# Patient Record
Sex: MALE | Race: WHITE | HISPANIC OR LATINO
[De-identification: names, ages, dates, MRNs, and addresses within clinical notes are randomized per-mention and may not be internally consistent; named-entity substitution may affect disease eponyms.]

---

## 2019-01-22 PROBLEM — Z00.00 ENCOUNTER FOR PREVENTIVE HEALTH EXAMINATION: Status: ACTIVE | Noted: 2019-01-22

## 2019-02-11 ENCOUNTER — RECORD ABSTRACTING (OUTPATIENT)
Age: 59
End: 2019-02-11

## 2019-02-11 DIAGNOSIS — E78.00 PURE HYPERCHOLESTEROLEMIA, UNSPECIFIED: ICD-10-CM

## 2019-02-11 DIAGNOSIS — Z80.41 FAMILY HISTORY OF MALIGNANT NEOPLASM OF OVARY: ICD-10-CM

## 2019-02-11 DIAGNOSIS — I10 ESSENTIAL (PRIMARY) HYPERTENSION: ICD-10-CM

## 2019-02-11 RX ORDER — RAMIPRIL 10 MG/1
10 CAPSULE ORAL DAILY
Refills: 0 | Status: ACTIVE | COMMUNITY

## 2019-02-11 RX ORDER — ROSUVASTATIN CALCIUM 5 MG/1
5 TABLET, FILM COATED ORAL DAILY
Refills: 0 | Status: ACTIVE | COMMUNITY

## 2019-02-21 ENCOUNTER — RESULT REVIEW (OUTPATIENT)
Age: 59
End: 2019-02-21

## 2019-02-21 ENCOUNTER — APPOINTMENT (OUTPATIENT)
Dept: UROLOGY | Facility: CLINIC | Age: 59
End: 2019-02-21
Payer: COMMERCIAL

## 2019-02-21 VITALS
BODY MASS INDEX: 29.73 KG/M2 | HEIGHT: 66 IN | SYSTOLIC BLOOD PRESSURE: 136 MMHG | DIASTOLIC BLOOD PRESSURE: 82 MMHG | WEIGHT: 185 LBS | HEART RATE: 74 BPM

## 2019-02-21 DIAGNOSIS — R63.1 POLYDIPSIA: ICD-10-CM

## 2019-02-21 DIAGNOSIS — Z87.438 PERSONAL HISTORY OF OTHER DISEASES OF MALE GENITAL ORGANS: ICD-10-CM

## 2019-02-21 PROCEDURE — 99214 OFFICE O/P EST MOD 30 MIN: CPT | Mod: 25

## 2019-02-21 PROCEDURE — 76857 US EXAM PELVIC LIMITED: CPT

## 2019-02-21 NOTE — CHIEF COMPLAINT
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

## 2019-02-21 NOTE — ASSESSMENT
[FreeTextEntry1] : Андрей West is a 58-year-old gentleman with long-standing history of polydipsia involutional urinary retention resulting in chronic bladder distention injury and chronic incomplete bladder emptying. It remains unclear as to what the patient's true postvoid residuals are outside the office as he is generally unable to urinate when asked. His residual today was one of the largest encountered. The absence of urinary urgency is clearly of concern and the danger of progressive bladder injury (including eventual permanent bladder dysfunction) and the risk of severe obstructive nephropathy, and infection were discussed with the patient today. Ultrasound findings suggest progressive prostatic growth which in turns suggest at outlet obstruction may becoming more of a factor. We last performed cystoscopy failed to demonstrate an urgent need for TURP or TULAP , and given this patient's volitional retention was further recognized that postoperative incomplete bladder entering remained a possibility.\par \par Intermittent self-catheterization was discussed today as a means of assuring bladder and they at least 3 times a day. The possible need for regular intermittent catheterization future was also discussed. For now the decision was made to proceed with cystoscopy to determine whether or not the time has come to reconsider surgical intervention.

## 2019-02-21 NOTE — HISTORY OF PRESENT ILLNESS
[FreeTextEntry1] : Last seen 05/14/18)\par        .\par        Андрей West is a 58-year-old gentleman with long-standing polydipsia associated with volitional urinary retention resulting in recurrent prostatitis and progressive bladder overdistention injury. In January of 2017 cystoscopy was performed to determine whether or not TULAP was indicated. Prior to examination Mr. Tejeda postvoid residual was 300 cc. The prostate gland appeared to be small without evidence of significant outlet obstruction. The bladder was found to be smooth. With rapid filling the patient was also able to empty his bladder completely with average force but with a quite prolonged urinary stream. \par        Mr. West was asked to cut back his fluid intake by half and to keep an intake and output log so he would have a better sense of his consumption and voiding pattern. Bladder flaccidity, was suspected to be the consequence of volitional overdistention, a finding that did prompt neurologic evaluation to rule out any obvious spinal lesions. Neurologic workup proved to be benign\par       When last seen in May of 2018 it appeared as though Mr. Tejeda had significantly reduced his fluid consumption. He reported he had been voiding every 2 hours, by the clock, as advised, and was voiding in  volumes ranging between 50 and 275 cc (average 150 cc).  TULAP was again discussed but it remained unclear if the surgery would help as the problem appears to be volitional.\par \par          According to the patient's most recent Intake and Output log he is voiding 6-8x/d in volumes 250 ml (in the AM) to 50 ml averaging 100-125 ml.  Intake ranges from 1100 to 700 ml/day (average 900-1000 ml).  As is often the case, the patient was unable to void in the office today.\par        .\par        PERTINENT UROLOGIC HISTORY:\par        .\par        11/16/15: PSA of 5.51; free PSA 12%\par        02/16/16: PSA 3.84; free PSA 15%\par        05/04/17: PSA 4.94; free PSA 14% (Dr. Frazier)\par        10/31/17: PSA 3.74; free PSA 20%\par        10/31/17: PELVIC ULTRASONOGRAPHY; bladder found to contain 298.77 cc. This was not postvoid residual. Postvoid residual calculated by extrapolation felt to be approximately 150 cc; prostate 66.16 cc.; PPSA 8.00\par        04/27/18: PSA 4.13; BUN 22; creatinine 1.06 (Dr. Frazier)\par        05/10/18: PSA 4.2; free PSA 19%\par \par 05/14/18: PSA 4.50; free PSA 25%; 4K Score 2%\par \par 02/21/19: Pelvic ultrasonography; 366.75 cc found within the bladder. This was no a postvoid residual as the patient had voided 2 hours earlier and had been unable to void ; Prostatic volume was estimated to be 87.34 cc. (PPSA 10.48); renal ultrasonography demonstrated normal kidneys bilaterally. There was no hydronephrosis.\par \par \par CURRENT UROLOGIC REVIEW OF SYSTEMS:\par \par Incontinence Assessed?.  YES\par \par Nocturia:  (-) 0-1 x/night\par Frequency: (-)  5-8   x/day    (TRYING TO VOID BY THE CLOCK AS ADVISED; REPORTS INTAKE "CLOSE TO A LITER A DAY, MAYBE MORE")\par Dysuria: (-)\par Urgency:  (-) \par Hesitancy:  (-)\par Intermittency:  (-)\par Sensation of Incomplete Voiding :  (-)\par Double voiding :  (-)\par Stress incontinence:  (-)\par Urge incontinence:  (-)\par Use of absorbent pads:  (-)\par Terminal dribbling:  (-)\par Status of stream: "WEAK"\par Venereal disease:  (-)\par Kidney stones:  (-)\par UTIs:  (+)  RECURRING PROSTATITIS\par Prior urologic surgery:  (+)   CYSTOSCOPY ONLY\par Hematuria:  (-)\par Abdominal pressure:  (-)\par Back pain:  (-)\par Sexual Status: ERECTIONS ARE GOOD; NO PAIN OR BLOOD WITH EJACULATION\par Gout:  (-)\par Renal problems:  (-)\par Family History of Urologic Problems:  (-)\par International Prostate Symptom Score (IPSS):  12 (Moderate 8-19)\par  Satisfaction Index: 2 (mostly satisfied)

## 2019-02-21 NOTE — REVIEW OF SYSTEMS
[Feeling Tired] : feeling tired [see HPI] : see HPI [Negative] : Heme/Lymph [Fever] : no fever [Chills] : no chills [Feeling Poorly] : not feeling poorly [Recent Weight Loss (___ Lbs)] : no recent weight loss [Visual Disturbances] : no visual disturbances [Loss Of Hearing] : no hearing loss [Nosebleeds] : no nosebleeds [Chest Pain] : no chest pain [Palpitations] : no palpitations [Leg Claudication] : no intermittent leg claudication [Lower Ext Edema] : no extremity edema [Shortness Of Breath] : no shortness of breath [Cough] : no cough [Orthopnea] : no orthopnea [Wheezing] : no wheezing [SOB on Exertion] : no shortness of breath during exertion [PND] : no PND [Abdominal Pain] : no abdominal pain [Constipation] : no constipation [Diarrhea] : no diarrhea [Heartburn] : no heartburn [Melena] : no melena

## 2019-02-21 NOTE — PHYSICAL EXAM
[General Appearance - Well Developed] : well developed [General Appearance - Well Nourished] : well nourished [Normal Appearance] : normal appearance [Well Groomed] : well groomed [General Appearance - In No Acute Distress] : no acute distress [Abdomen Soft] : soft [Abdomen Tenderness] : non-tender [Abdomen Mass (___ Cm)] : no abdominal mass palpated [Abdomen Hernia] : no hernia was discovered [Costovertebral Angle Tenderness] : no ~M costovertebral angle tenderness [Size (3+)] : size was 3+ [Rectal Exam - Seminal Vesicles] : was normal [Nl Sphincter Tone] : normal sphincter tone [Nl Perineum] : perineum was normal on inspection [No Lesions] : no lesions [Normal] : normal [Testes] : normal [Epididymis] : was normal [Skin Color & Pigmentation] : normal skin color and pigmentation [5th Left ICS - MCL] : palpated at the 5th LICS in the midclavicular line [Normal Rate] : normal [Normal S1] : normal S1 [Normal S2] : normal S2 [No Murmur] : no murmurs heard [No Pitting Edema] : no pitting edema present [] : no respiratory distress [Respiration, Rhythm And Depth] : normal respiratory rhythm and effort [Exaggerated Use Of Accessory Muscles For Inspiration] : no accessory muscle use [Auscultation Breath Sounds / Voice Sounds] : lungs were clear to auscultation bilaterally [Chest Palpation] : palpation of the chest revealed no abnormalities [Lungs Percussion] : the lungs were normal to percussion [Oriented To Time, Place, And Person] : oriented to person, place, and time [Affect] : the affect was normal [Mood] : the mood was normal [Not Anxious] : not anxious [Normal Station and Gait] : the gait and station were normal for the patient's age [No Focal Deficits] : no focal deficits [No Palpable Adenopathy] : no palpable adenopathy [Prostate Hard Area Or Nodule Bilaterally] : had no palpable nodules [Rectal Exam - Prostate] : was not indurated [Prostate Tenderness] : was not tender [Prostate Fluctuant] : was not fluctuant [Occult Blood Positive] : exam was negative for occult blood [Rectal Tenderness] : no tenderness [Mass___cm] : no rectal masses [Adherent Prepuce] : no adherence of the prepuce [Circumcised] : the penis was uncircumcised [S3] : no S3 [S4] : no S4 [FreeTextEntry1] : NORMAL DTR; NORMAL PERINEAL SENSATION, NORMAL CRANIAL NERVES

## 2019-02-22 ENCOUNTER — TRANSCRIPTION ENCOUNTER (OUTPATIENT)
Age: 59
End: 2019-02-22

## 2019-04-29 ENCOUNTER — APPOINTMENT (OUTPATIENT)
Dept: UROLOGY | Facility: CLINIC | Age: 59
End: 2019-04-29
Payer: COMMERCIAL

## 2019-04-29 ENCOUNTER — RESULT REVIEW (OUTPATIENT)
Age: 59
End: 2019-04-29

## 2019-04-29 VITALS
WEIGHT: 185 LBS | DIASTOLIC BLOOD PRESSURE: 68 MMHG | SYSTOLIC BLOOD PRESSURE: 119 MMHG | HEIGHT: 66 IN | HEART RATE: 72 BPM | BODY MASS INDEX: 29.73 KG/M2

## 2019-04-29 DIAGNOSIS — N40.1 BENIGN PROSTATIC HYPERPLASIA WITH LOWER URINARY TRACT SYMPMS: ICD-10-CM

## 2019-04-29 LAB
BACTERIA: 0
BILIRUB UR QL STRIP: NORMAL
CASTS: 0
CLARITY UR: CLEAR
COLLECTION METHOD: NORMAL
CRYSTALS: 0
EPITHELIAL CELLS: 0
GLUCOSE UR-MCNC: NORMAL
HCG UR QL: NORMAL EU/DL
HGB UR QL STRIP.AUTO: NORMAL
KETONES UR-MCNC: NORMAL
LEUKOCYTE ESTERASE UR QL STRIP: NORMAL
MUCUS: 0
NITRITE UR QL STRIP: NORMAL
PH UR STRIP: 6
PROT UR STRIP-MCNC: NORMAL
RBC CASTS # UR COMP ASSIST: 0
SP GR UR STRIP: 1.01
WBC: 0

## 2019-04-29 PROCEDURE — 51798 US URINE CAPACITY MEASURE: CPT

## 2019-04-29 PROCEDURE — 51741 ELECTRO-UROFLOWMETRY FIRST: CPT

## 2019-04-29 PROCEDURE — 81000 URINALYSIS NONAUTO W/SCOPE: CPT

## 2019-04-29 PROCEDURE — 99213 OFFICE O/P EST LOW 20 MIN: CPT | Mod: 25

## 2019-04-29 PROCEDURE — 52000 CYSTOURETHROSCOPY: CPT

## 2019-04-29 NOTE — HISTORY OF PRESENT ILLNESS
[FreeTextEntry1] : (Last seen 02/21/19)\par \par Андрей West is a 58-year-old gentleman with long-standing history of polydipsia, volitional urinary retention resulting in chronic bladder distention injury and chronic incomplete bladder emptying. It remains unclear as to what the patient's true postvoid residuals are, outside the office, as he is generally unable to urinate when asked. Residuals appear to be increasing. \par \par The absence of urinary urgency is clearly of concern and the danger of progressive bladder injury (including eventual permanent bladder dysfunction) and the risk of severe obstructive nephropathy, and infection have been discussed with the patient on multiple occasions. Additionally ultrasound findings suggested progressive prostatic growth which in turns suggested that outlet obstruction may becoming more of a factor.  It is appreciated the patient has cut down sign on his fluid consumption, and he is making an effort to void by the clock (currently voiding on average every 2.5 hours while awake), but despite this has 350 cc in his bladder at this time, without a sensation of urinary urgency or significant bladder filling.\par \par When last performed cystoscopy failed to demonstrate an urgent need for TURP or TULAP , and given this patient's volitional retention it was further recognized that postoperative intermittent catheterization may still be required post op due to chronic incomplete bladder emptying.\par \par Intermittent self-catheterization has been discussed in the past but not favored by Mr. West. The decision was made to proceed with cystoscopy to determine whether or not the time has come to reconsider surgical intervention.

## 2019-04-29 NOTE — PHYSICAL EXAM
[General Appearance - Well Developed] : well developed [General Appearance - Well Nourished] : well nourished [Normal Appearance] : normal appearance [Well Groomed] : well groomed [General Appearance - In No Acute Distress] : no acute distress [Bowel Sounds] : normal bowel sounds [Abdomen Soft] : soft [Abdomen Tenderness] : non-tender [Abdomen Mass (___ Cm)] : no abdominal mass palpated [Abdomen Hernia] : no hernia was discovered [Costovertebral Angle Tenderness] : no ~M costovertebral angle tenderness [Urethral Meatus] : meatus normal [Penis Abnormality] : normal uncircumcised penis [Scrotum] : the scrotum was normal [5th Left ICS - MCL] : palpated at the 5th LICS in the midclavicular line [Normal Rate] : normal [Normal S1] : normal S1 [Normal S2] : normal S2 [No Murmur] : no murmurs heard [No Pitting Edema] : no pitting edema present [] : no respiratory distress [Respiration, Rhythm And Depth] : normal respiratory rhythm and effort [Exaggerated Use Of Accessory Muscles For Inspiration] : no accessory muscle use [Auscultation Breath Sounds / Voice Sounds] : lungs were clear to auscultation bilaterally [Chest Palpation] : palpation of the chest revealed no abnormalities [Lungs Percussion] : the lungs were normal to percussion [Oriented To Time, Place, And Person] : oriented to person, place, and time [Affect] : the affect was normal [Mood] : the mood was normal [Not Anxious] : not anxious [No Focal Deficits] : no focal deficits [S3] : no S3 [S4] : no S4

## 2019-04-29 NOTE — PROCEDURE
[Cystoscopy] : cystoscopy [Other ___] : [unfilled] [Patient] : the patient [Pt took necessary Preparations and Antibiotics for Procedure] : pt took necessary preparations and antibiotics for procedure [Other: ___] : [unfilled] [Ciprofloxacin] : Ciprofloxacin [Supine] : supine [Betadine] : with betadine [Intraurethral 2% Lidocaine Gel ___ (cc)] : [unfilled] cc of 2% Lidocaine Gel was administered intraurethrally  [Flexible Cystoscope] : A flexible cystoscope was used to visualize the urethra and bladder. [Instrumented] : an instrumented [Urinalysis] : urinalysis [No Complications] : There were no complications [Tolerated Well] : the patient tolerated the procedure well [Ciprofloxacin] : Ciprofloxacin [unfilled] [Post procedure instructions and information given] : post procedure instructions and information given and reviewed with patient. [0] : 0 [de-identified] : A flexible cystoscope was passed per urethra under direct vision. The pendulous URETHRA was normal except for some tortuosity. The bulbar urethra was also normal. The sphincter was normal both in appearance and function.\par \par The PROSTATE proved to be quite long, 4.5-5 cm in length the bladder neck to verumontanum. Visually obstructing trilobar hyperplasia was present. A large volume of discharge was expressed as the cystoscope traversed the prostatic urethra.\par \par The BLADDER was 2+ trabeculated on the floor and 1+ trabeculated on the dome. The patient was unable to void prior to the study. 350 mL of urine were aspirated from the bladder. The bladder was patulous from chronic over distention. Ureteral orifices were close to the bladder neck where the prostate was seen to protrude mildly into the bladder lumen and a 360° manner. No efflux was seen coming from either UO. It was noted as a carcinoma, inflammation, foreign body or stone.\par \par NOTE:\par \par At the end of the procedure the patient was asked to void in the complex uroflow machine. The patient voided 141 cc with an extremely prolonged and highly intermittent stream. He was clearly straining to void. Ultrasonography was used to check a postvoid residual in the case. Using ULTRASOUND criteria the POSTVOID RESIDUAL was estimated to be 150.74 cc. PROSTATIC VOLUME was estimated to be 82.11 cc (PPSA 9.85).\par \par IMPRESSION:\par \par Findings today include the presence of a large obstructing prostate, incomplete bladder emptying with a 151 cc postvoid residual confirmed, a compromised urinary stream, bladder overdistention without commensurate sensation and the bladder with the appearance of being chronically stretched out. While postoperative intermittent catheterization may still be required (especially if the patient resumes his habitual polydipsia and volitional retention) it would appear TULAP is indicated in an attempt to diminish the chances of progressive deterioration that can be attributed to confirm outlet obstruction.\par \par RECOMMENDATIONS:\par \par Would advise the patient to proceed with TULAP at this time. The possibility of the need for postoperative intermittent catheterization was discussed with the patient and his wife. Indications, alternatives, risks, benefits and possible complications of TULAP were discussed at length. It is likely Mr. West Will proceed with TULAP in the near future.

## 2019-04-29 NOTE — CHIEF COMPLAINT
[FreeTextEntry1] : BPH/LUTS WITH NEUROPATHIC BLADDER DYSFUNCTION AS A CONSEQUENCE OF POLYDIPSIA AND BLADDER OVERDISTENTION: Here for cystoscopy to determine if surgery is indicated

## 2019-04-29 NOTE — ASSESSMENT
[FreeTextEntry1] : Андрей West is a 57yo man who has suffered a bladder distention injury due to chronic polydipsia and volitional urinary retention.  His condition is deteriorating as his prostate enlarges.  His gland is now over 80 gms.  and 4.5-5 cm in length (bladder neck to verumontanum).  The true PVR is 150 mL even under ideal circumstances (immediately after cystoscopy) and the stream is truly compromised.  TULAP has been advised.

## 2019-05-10 ENCOUNTER — TRANSCRIPTION ENCOUNTER (OUTPATIENT)
Age: 59
End: 2019-05-10

## 2019-05-15 ENCOUNTER — APPOINTMENT (OUTPATIENT)
Dept: UROLOGY | Facility: HOSPITAL | Age: 59
End: 2019-05-15

## 2019-05-16 ENCOUNTER — RX RENEWAL (OUTPATIENT)
Age: 59
End: 2019-05-16

## 2019-05-30 ENCOUNTER — APPOINTMENT (OUTPATIENT)
Dept: UROLOGY | Facility: CLINIC | Age: 59
End: 2019-05-30
Payer: COMMERCIAL

## 2019-05-30 ENCOUNTER — RESULT REVIEW (OUTPATIENT)
Age: 59
End: 2019-05-30

## 2019-05-30 VITALS
HEIGHT: 66 IN | HEART RATE: 68 BPM | SYSTOLIC BLOOD PRESSURE: 118 MMHG | WEIGHT: 172 LBS | DIASTOLIC BLOOD PRESSURE: 70 MMHG | BODY MASS INDEX: 27.64 KG/M2

## 2019-05-30 DIAGNOSIS — N32.89 OTHER SPECIFIED DISORDERS OF BLADDER: ICD-10-CM

## 2019-05-30 DIAGNOSIS — Z78.9 OTHER SPECIFIED HEALTH STATUS: ICD-10-CM

## 2019-05-30 DIAGNOSIS — R33.9 RETENTION OF URINE, UNSPECIFIED: ICD-10-CM

## 2019-05-30 DIAGNOSIS — N31.2 FLACCID NEUROPATHIC BLADDER, NOT ELSEWHERE CLASSIFIED: ICD-10-CM

## 2019-05-30 LAB
BACTERIA: 0
BILIRUB UR QL STRIP: NORMAL
CASTS: 0
CRYSTALS: 0
EPITHELIAL CELLS: 0
GLUCOSE UR-MCNC: NORMAL
HCG UR QL: 0.2 EU/DL
HGB UR QL STRIP.AUTO: NORMAL
KETONES UR-MCNC: NORMAL
LEUKOCYTE ESTERASE UR QL STRIP: NORMAL
MUCUS: 0
NITRITE UR QL STRIP: NORMAL
PH UR STRIP: 5.5
PROT UR STRIP-MCNC: NORMAL
RBC CASTS # UR COMP ASSIST: NORMAL
SP GR UR STRIP: 1.03
WBC: NORMAL

## 2019-05-30 PROCEDURE — 76856 US EXAM PELVIC COMPLETE: CPT

## 2019-05-30 PROCEDURE — 81000 URINALYSIS NONAUTO W/SCOPE: CPT

## 2019-05-30 PROCEDURE — 99024 POSTOP FOLLOW-UP VISIT: CPT

## 2019-05-30 NOTE — HISTORY OF PRESENT ILLNESS
[FreeTextEntry1] : ( last seen in hospital 05/16/19)\par \par Андрей West is a 58-year-old gentleman with a long standing voiding difficulties secondary to polydipsia and volitional urinary retention resulting in chronic bladder distention injury and chronic incomplete emptying.  The patient's condition was complicated by an enlarged prostate gland process estimated to be 82 g prior to surgery) with a large ball valving median lobe. Recently the patient was found to have a postvoid residual of 350 mL.  This finding prompted the advice that time and come to maximize this gentleman's ability to urinate on his own by elevating any outlet obstruction. It was suspected that even after surgery he would require intermittent catheterization due to bladder dysfunction.\par \par On 05/15/19 Mr. West was brought to the operating room for TULAP. Surgery proved to be uneventful with excellent visual result. As anticipated the patient required intermittent catheterization postop. He felt confident enough to go home using CIC on postop day #1.\par \par Difficulty with catheterization, possibly complicated by clot formation, prompted a visit to the [place] emergency room on 05/17/19. 800 mL were present within the bladder. The patient was discharged on CIC after bladder irrigation.\par \par Mr. West again returned to the emergency room on May 24 after developing a fever to 101°F. The patient was started on meropenem and ceftriaxone. Blood cultures were negative. Urine culture eventually grew a coag negative staph sensitive to gentamicin, nitrofurantoin, tetracycline, Bactrim DS, and vancomycin. The patient never had significant white count and H&H remained stable.  The patient was discharged on 05/27/19.  In hospital he was found to be voiding in large volumes (up to 450 mL but was emptying his bladder completely (!).  He was advised to stop CIC and discharged on Cefdinir 300 mg BID.  Elevated LFT's prompted an U/S of the gallbladder which proved to be unremarkable.  Mr. GUSTAFSON also stopped his Crestor.\par \par At this time Mr. West reports he is voiding spontaneously , every 2 hours in volumes of 100 - 150 mL during the day and in volumes up to 250 mL in the evening after sleeping 4 hours. The sensation does wake him up with these larger volumes.  It was appreciated that Mr. West voided only 59 mL in the complex uroflow machine today.  The stream was of average force for this minimal volume but was also quite prolonged.  The patient this was an atypical stream.\par \par The patient reports he is voiding spontaneously , every 2 hours in volumes of 100 - 150 mL during the day and in volumes up to 250 mL in the evening after sleeping 4 hours. The sensation does wake him up with these larger volumes.\par \par PERTINENT UROLOGIC HISTORY:\par \par 05/15/18: PSA 4.50; free PSA 25%; 4K Score 2%\par \par 05/15/19:  TULAP; preoperative prostatic volume 82 g; lasing time 45 minutes 36 seconds; total energy 304,576 J\par \par CURRENT UROLOGIC REVIEW OF SYSTEMS:\par \par Incontinence Assessed?.  YES\par \par Nocturia:  (-) 0-1  x/night  (NOT SLEEPING WELL, 4-5H/NIGHT)\par Frequency: (+)   8  x/day    VOIDING Q2H BY THE CLOCK AND WITH SENSATION\par Dysuria: (-)\par Urgency:  (-) \par Hesitancy:  (-)\par Intermittency:  (-)\par Sensation of Incomplete Voiding :  (-)\par Double voiding :  (-)\par Stress incontinence:  (+) MINIMAL VOLUME IF HARD LAUGHING\par Urge incontinence:  (-)\par Use of absorbent pads:  (-)\par Terminal dribbling:  (-)\par Status of stream: THE STREAM IS INTERMITTENT AND REQUIRES STRAINING TO EMPTY COMPLETELY (CONSISTENT WITH NEUROPATHIC/FLACCID BLADDER\par Venereal disease:  (-)\par Kidney stones:  (-)\par UTIs:  (+) 05/24/19 (Coag negative staph)\par Prior urologic surgery:  (+)  TULAP 05/15/19\par Hematuria:  (+)   2 WEEKS POST OP WITH RECENT MG AND CIC\par Abdominal pressure:  (-)\par Back pain:  (-)\par Sexual Status: ERECTIONS ARE WORKING\par Gout:  (-)\par Renal problems:  (-)\par Family History of Urologic Problems:  (-)\par

## 2019-05-30 NOTE — ASSESSMENT
[FreeTextEntry1] : Андрей West is a 58-year-old gentleman now approximately 2 weeks status post TULAP. While the requirement for intermittent catheterization had been anticipated it turned out Mr. West had to catheterize himself from only a short period of time after surgery. He is now voiding spontaneously and emptying his bladder completely. Due to bladder flaccidity he still has to strain to empty completely but reports that his stream is significantly better than it had been.\par \par At this time is to MACIE is voiding by the clock, every 2 hours for volumes generally ranging between 100 - 125 mL during the day and up to 250 mL and night.  He reports that he has sensation of bladder filling even at the smaller volumes. He's been advised to remain vigilant regarding the amount of fluid he consumes daily to assure that he is not over distend his bladder as a suspected sensation is still not back to normal. It is hoped that bladder tone and function will return as he continues to keep his bladder empty with timed voiding.

## 2019-05-30 NOTE — CHIEF COMPLAINT
[FreeTextEntry1] : 1.  2 WEEKS STATUS POST TULAP: Here for postop followup\par 2.  NEUROPATHIC BLADDER MANAGED WITH INTERMITTENT CATHETERIZATION\par \par

## 2019-05-30 NOTE — REVIEW OF SYSTEMS
[Fever] : no fever [Chills] : no chills [Feeling Poorly] : not feeling poorly [Recent Weight Loss (___ Lbs)] : no recent weight loss [Visual Disturbances] : no visual disturbances [Loss Of Hearing] : no hearing loss [Nosebleeds] : no nosebleeds [Chest Pain] : no chest pain [Palpitations] : no palpitations [Leg Claudication] : no intermittent leg claudication [Lower Ext Edema] : no extremity edema [Shortness Of Breath] : no shortness of breath [Cough] : no cough [Orthopnea] : no orthopnea [Wheezing] : no wheezing [SOB on Exertion] : no shortness of breath during exertion [PND] : no PND [Abdominal Pain] : no abdominal pain [Constipation] : no constipation [Diarrhea] : no diarrhea [Heartburn] : no heartburn [Melena] : no melena

## 2019-06-12 ENCOUNTER — RX CHANGE (OUTPATIENT)
Age: 59
End: 2019-06-12

## 2019-06-12 DIAGNOSIS — R31.9 URINARY TRACT INFECTION, SITE NOT SPECIFIED: ICD-10-CM

## 2019-06-12 DIAGNOSIS — N39.0 URINARY TRACT INFECTION, SITE NOT SPECIFIED: ICD-10-CM

## 2019-06-12 LAB — BACTERIA UR CULT: ABNORMAL

## 2019-06-25 ENCOUNTER — APPOINTMENT (OUTPATIENT)
Dept: UROLOGY | Facility: CLINIC | Age: 59
End: 2019-06-25
Payer: COMMERCIAL

## 2019-06-25 ENCOUNTER — RESULT REVIEW (OUTPATIENT)
Age: 59
End: 2019-06-25

## 2019-06-25 VITALS
HEART RATE: 66 BPM | WEIGHT: 172 LBS | BODY MASS INDEX: 27.64 KG/M2 | OXYGEN SATURATION: 100 % | RESPIRATION RATE: 18 BRPM | HEIGHT: 66 IN | DIASTOLIC BLOOD PRESSURE: 70 MMHG | SYSTOLIC BLOOD PRESSURE: 126 MMHG

## 2019-06-25 PROCEDURE — 51741 ELECTRO-UROFLOWMETRY FIRST: CPT

## 2019-06-25 PROCEDURE — 81000 URINALYSIS NONAUTO W/SCOPE: CPT

## 2019-06-25 PROCEDURE — 99213 OFFICE O/P EST LOW 20 MIN: CPT | Mod: 25

## 2019-06-25 PROCEDURE — 76856 US EXAM PELVIC COMPLETE: CPT

## 2019-07-21 ENCOUNTER — LABORATORY RESULT (OUTPATIENT)
Age: 59
End: 2019-07-21

## 2019-07-22 ENCOUNTER — RESULT REVIEW (OUTPATIENT)
Age: 59
End: 2019-07-22

## 2019-07-22 ENCOUNTER — APPOINTMENT (OUTPATIENT)
Dept: UROLOGY | Facility: CLINIC | Age: 59
End: 2019-07-22
Payer: COMMERCIAL

## 2019-07-22 VITALS
SYSTOLIC BLOOD PRESSURE: 118 MMHG | WEIGHT: 173 LBS | BODY MASS INDEX: 27.8 KG/M2 | HEIGHT: 66 IN | HEART RATE: 72 BPM | DIASTOLIC BLOOD PRESSURE: 78 MMHG

## 2019-07-22 DIAGNOSIS — N31.2 FLACCID NEUROPATHIC BLADDER, NOT ELSEWHERE CLASSIFIED: ICD-10-CM

## 2019-07-22 DIAGNOSIS — Z87.898 PERSONAL HISTORY OF OTHER SPECIFIED CONDITIONS: ICD-10-CM

## 2019-07-22 DIAGNOSIS — Z98.890 OTHER SPECIFIED POSTPROCEDURAL STATES: ICD-10-CM

## 2019-07-22 LAB
BACTERIA: 0
BILIRUB UR QL STRIP: NORMAL
BILIRUB UR QL STRIP: NORMAL
CASTS: 0
CLARITY UR: CLEAR
CLARITY UR: CLEAR
COLLECTION METHOD: NORMAL
COLLECTION METHOD: NORMAL
CRYSTALS: 0
EPITHELIAL CELLS: NORMAL
GLUCOSE UR-MCNC: NORMAL
GLUCOSE UR-MCNC: NORMAL
HCG UR QL: 0.2 EU/DL
HCG UR QL: 0.2 EU/DL
HGB UR QL STRIP.AUTO: NORMAL
HGB UR QL STRIP.AUTO: NORMAL
KETONES UR-MCNC: NORMAL
KETONES UR-MCNC: NORMAL
LEUKOCYTE ESTERASE UR QL STRIP: NORMAL
LEUKOCYTE ESTERASE UR QL STRIP: NORMAL
MUCUS: 0
NITRITE UR QL STRIP: NORMAL
NITRITE UR QL STRIP: NORMAL
PH UR STRIP: 5.5
PH UR STRIP: 5.5
PROT UR STRIP-MCNC: NORMAL
PROT UR STRIP-MCNC: NORMAL
RBC CASTS # UR COMP ASSIST: 0
SP GR UR STRIP: 1.02
SP GR UR STRIP: 1.03
WBC: NORMAL

## 2019-07-22 PROCEDURE — 99024 POSTOP FOLLOW-UP VISIT: CPT

## 2019-07-22 PROCEDURE — 51741 ELECTRO-UROFLOWMETRY FIRST: CPT

## 2019-07-22 PROCEDURE — 36415 COLL VENOUS BLD VENIPUNCTURE: CPT

## 2019-07-22 PROCEDURE — 51798 US URINE CAPACITY MEASURE: CPT

## 2019-07-22 PROCEDURE — 81000 URINALYSIS NONAUTO W/SCOPE: CPT

## 2019-07-22 RX ORDER — DOXAZOSIN 4 MG/1
4 TABLET ORAL
Refills: 0 | Status: DISCONTINUED | COMMUNITY
End: 2019-07-22

## 2019-07-22 RX ORDER — SULFAMETHOXAZOLE AND TRIMETHOPRIM 800; 160 MG/1; MG/1
800-160 TABLET ORAL
Qty: 20 | Refills: 0 | Status: DISCONTINUED | COMMUNITY
Start: 2019-06-12 | End: 2019-07-22

## 2019-07-22 RX ORDER — CIPROFLOXACIN HYDROCHLORIDE 500 MG/1
500 TABLET, FILM COATED ORAL
Qty: 3 | Refills: 0 | Status: DISCONTINUED | COMMUNITY
Start: 2019-02-21 | End: 2019-07-22

## 2019-07-22 NOTE — ASSESSMENT
[FreeTextEntry1] : Now approximately 8 weeks status post surgery Mr. QUIJANO continues to make extraordinary progress.  He is able to empty his bladder completely and appears to be having recovery of bladder function. For now followup in 3 months as planned. Pelvic ultrasonography will be repeated at that time to reestablish baseline prostatic volume. PSA will be repeated if required.

## 2019-07-22 NOTE — CHIEF COMPLAINT
[FreeTextEntry1] : 1. 8 WEEKS STATUS POST TULAP: Here for postop followup to monitor bladder function an dto get new baseline PSA\par \par 2. NEUROPATHIC BLADDER NO LONGER MANAGED WITH INTERMITTENT CATHETERIZATION

## 2019-07-22 NOTE — PHYSICAL EXAM
[General Appearance - Well Nourished] : well nourished [Normal Appearance] : normal appearance [Well Groomed] : well groomed [General Appearance - In No Acute Distress] : no acute distress [Bowel Sounds] : normal bowel sounds [Abdomen Soft] : soft [Abdomen Tenderness] : non-tender [Abdomen Hernia] : no hernia was discovered [Abdomen Mass (___ Cm)] : no abdominal mass palpated [Urethral Meatus] : meatus normal [Costovertebral Angle Tenderness] : no ~M costovertebral angle tenderness [Penis Abnormality] : normal uncircumcised penis [Scrotum] : the scrotum was normal [Urinary Bladder Findings] : the bladder was normal on palpation [Epididymis] : the epididymides were normal [Testes Tenderness] : no tenderness of the testes [Respiration, Rhythm And Depth] : normal respiratory rhythm and effort [] : no respiratory distress [Exaggerated Use Of Accessory Muscles For Inspiration] : no accessory muscle use [Auscultation Breath Sounds / Voice Sounds] : lungs were clear to auscultation bilaterally [Chest Palpation] : palpation of the chest revealed no abnormalities [Oriented To Time, Place, And Person] : oriented to person, place, and time [Lungs Percussion] : the lungs were normal to percussion [Affect] : the affect was normal [Mood] : the mood was normal [Normal Station and Gait] : the gait and station were normal for the patient's age [Not Anxious] : not anxious [No Focal Deficits] : no focal deficits [No Palpable Adenopathy] : no palpable adenopathy

## 2019-07-22 NOTE — REVIEW OF SYSTEMS
[Feeling Tired] : feeling tired [see HPI] : see HPI [Negative] : Heme/Lymph [Fever] : no fever [Chills] : no chills [Feeling Poorly] : not feeling poorly [Recent Weight Loss (___ Lbs)] : no recent weight loss [Visual Disturbances] : no visual disturbances [Loss Of Hearing] : no hearing loss [Nosebleeds] : no nosebleeds [Chest Pain] : no chest pain [Palpitations] : no palpitations [Leg Claudication] : no intermittent leg claudication [Lower Ext Edema] : no extremity edema [Shortness Of Breath] : no shortness of breath [Orthopnea] : no orthopnea [Cough] : no cough [Wheezing] : no wheezing [SOB on Exertion] : no shortness of breath during exertion [PND] : no PND [Abdominal Pain] : no abdominal pain [Constipation] : no constipation [Diarrhea] : no diarrhea [Melena] : no melena [Heartburn] : no heartburn

## 2019-07-22 NOTE — HISTORY OF PRESENT ILLNESS
[FreeTextEntry1] : (Last seen 05/30/19)\par \par Андрей QUIJANO is a 58-year-old gentleman now approximately a week status post TULAP. Surgery was performed secondary to long-standing voiding difficulties secondary to polydipsia and volitional urinary retention resulting in chronic bladder distention injury and chronic incomplete emptying. The patient's condition was complicated by an enlarged prostate gland (estimated to be 82 g prior to surgery) with a large ball valving median lobe associated with a postvoid residual over 350 mL.\par \par Mainly postop Mr. QUIJANO required intermittent self-catheterization. At that time his last evaluation it appeared as though he was able to empty his bladder well by bearing down and intermittent catheterization was discontinued. He returned to the office today to assure that he is emptying his bladder adequately using this technique and to repeat his PSA to reestablish baseline value.\par \par \par \par PERTINENT UROLOGIC HISTORY:\par \par 05/15/18: PSA 4.50; free PSA 25%; 4K Score 2%\par \par 05/15/19: TULAP; preoperative prostatic volume 82 g; lasing time 45 minutes 36 seconds; total energy 304,576 J\par \par 07/22/19:  postvoid residual  5.27 mL\par \par CURRENT UROLOGIC REVIEW OF SYSTEMS:\par \par Incontinence Assessed?. YES\par \par Nocturia: (-) 1 x/night (NOT SLEEPING WELL, 4-5H/NIGHT)  AT 4AM CAN VOID 200-300 Ml\par Frequency: (+) 4-6 x/day VOIDING  BY  SENSATION (advised to resume voiding by the clock) \par Dysuria: (-)\par Urgency: (-) \par Hesitancy: (-)\par Intermittency: (-) OCCURS IF BLADDER IS ESSENTIALLY EMPTY AND VOIDING ON REQUEST (LIKE TODAY IN THE OFFICE)\par Sensation of Incomplete Voiding : (-)\par Double voiding : (-)\par Stress incontinence: (+) INCONSEQUENTIAL AT THIS TIME\par Urge incontinence: (-)\par Use of absorbent pads: (-)\par Terminal dribbling: (-)\par Status of stream: THE STREAM IS NOW "WAY BETTER THAN IT HAD BEEN PRIOR TO SURGERY"; WITH LARGE VOLUME STREAM IS "QUITE POWERFUL"(CONSISTENT WITH BLADDER RECOVERY"\par Venereal disease: (-)\par Kidney stones: (-)\par UTIs: (+) 05/24/19 (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

## 2019-08-23 LAB
BACTERIA UR CULT: NORMAL
BACTERIA UR CULT: NORMAL
BACTERIA: 0
CASTS: 0
CRYSTALS: 0
EPITHELIAL CELLS: NORMAL
MUCUS: 0
RBC CASTS # UR COMP ASSIST: NORMAL
WBC: NORMAL

## 2019-08-23 NOTE — LETTER BODY
[Dear  ___] : Dear  [unfilled], [Courtesy Letter:] : I had the pleasure of seeing your patient, [unfilled], in my office today. [Please see my note below.] : Please see my note below. [Sincerely,] : Sincerely, [FreeTextEntry3] : MARCI Stephenson M.D.\par

## 2019-08-23 NOTE — REVIEW OF SYSTEMS
[see HPI] : see HPI [Negative] : Heme/Lymph [Fever] : no fever [Chills] : no chills [Feeling Poorly] : not feeling poorly [Feeling Tired] : not feeling tired [Recent Weight Loss (___ Lbs)] : no recent weight loss [Visual Disturbances] : no visual disturbances [Loss Of Hearing] : no hearing loss [Nosebleeds] : no nosebleeds [Chest Pain] : no chest pain [Palpitations] : no palpitations [Leg Claudication] : no intermittent leg claudication [Lower Ext Edema] : no extremity edema [Shortness Of Breath] : no shortness of breath [Cough] : no cough [Orthopnea] : no orthopnea [Wheezing] : no wheezing [SOB on Exertion] : no shortness of breath during exertion [PND] : no PND [Abdominal Pain] : no abdominal pain [Constipation] : no constipation [Diarrhea] : no diarrhea [Heartburn] : no heartburn [Melena] : no melena

## 2019-08-23 NOTE — HISTORY OF PRESENT ILLNESS
[FreeTextEntry1] : (Last seen 05/30/19, 2 weeks post op)\par \par Андрей West is a 57yo man S/P TULAP performed 05/15/19 performed when the patient was found to have a post void residual of 350 mL.  Urinary retention was multifactorial, secondary to polydipsia with chronic, volitional urinary retention aggravated by outlet obstruction due to an 82gm gland with a ball valving median lobe.  Despite timed voiding, reduced fluid consumption and alpha blocking medication the PVR continued to increase until unacceptable levels were reached.  The decision was made to maximize the patient's ability to empty his bladder with surgical intervention.  The need for post op intermittent catheterization was anticipated but now no longer seems required.  Mr. West reports he has been emptying his bladder well and the volumes collected with CIC have been minimal allowing him to discontinue self cath'ing.\par \par PERTINENT UROLOGIC HISTORY:\par \par 05/15/18: PSA 4.50; free PSA 25%; 4K Score 2%\par \par 05/15/19: TULAP; preoperative prostatic volume 82 g; preoperative  mL;  lasing time 45 minutes 36 seconds; total energy 304,576 J\par \par 06/25/19: PELVIC ULTRASONOGRAPHY; post void residual 24.10 mL (!); Prostatic volume 43.99 cc; PPSA 5.28\par \par CURRENT UROLOGIC REVIEW OF SYSTEMS:\par \par Incontinence Assessed?. YES\par \par Nocturia: (-) 1 x/night (AT 3 AM)\par Frequency: (-) 4-5 x/day VOIDING Q4H BY THE CLOCK AND WITH SENSATION\par Dysuria: (-)\par Urgency: (-) \par Hesitancy: (-)\par Intermittency: (+)  (IN THE ABSENCE OF OUTLET OBSTRUCTION ABLE TO EMPTY BY STRAINING)\par Sensation of Incomplete Voiding : (-)\par Double voiding : (-)\par Stress incontinence: (-) \par Urge incontinence: (-)\par Use of absorbent pads: (-)\par Terminal dribbling: (-)\par Status of stream: STREAM REPORTED TO BE VARIABLE, OCCASIONALLY QUITE STRONG BUT STILL INTERMITTENT REQUIRING STRAINING TO EMPTY COMPLETELY (CONSISTENT WITH CHRONIC NEUROPATHIC/FLACCID BLADDER)\par Venereal disease: (-)\par Kidney stones: (-)\par UTIs: (+) 05/24/19 (Coag negative staph)\par Prior urologic surgery: (+) TULAP 05/15/19\par Hematuria: (+) MICROSCOPIC ONLY\par Abdominal pressure: (-)\par Back pain: (-)\par Sexual Status: ERECTIONS ARE WORKING\par Gout: (-)\par Renal problems: (-)\par Family History of Urologic Problems: (-)\par International Prostate Symptom Score (IPSS):  7 (Mild 0-7) !!\par Satisfaction Index: 0 (delighted)\par \par \par \par \par

## 2019-08-23 NOTE — ASSESSMENT
[FreeTextEntry1] : Excellent response to surgical ntervention as hoped.  Mr. West is now able to empty his bladder completely though this still requires abdominal contraction (straining) to empty as the bladder remains dysfunctional. Prostatic volume is approximately half of what it was prior to surgical intervention. Continued shrinkage is to be expected.  \par \par Followup in 3 months is planned.

## 2019-08-23 NOTE — CHIEF COMPLAINT
[FreeTextEntry1] : 1. 6 WEEKS STATUS POST TULAP: Here for post op re-evaluation\par 2. NEUROPATHIC BLADDER: No longer managed with CIC\par

## 2019-08-23 NOTE — PHYSICAL EXAM
[General Appearance - Well Nourished] : well nourished [Normal Appearance] : normal appearance [General Appearance - In No Acute Distress] : no acute distress [Well Groomed] : well groomed [Bowel Sounds] : normal bowel sounds [Abdomen Tenderness] : non-tender [Abdomen Soft] : soft [Costovertebral Angle Tenderness] : no ~M costovertebral angle tenderness [Abdomen Mass (___ Cm)] : no abdominal mass palpated [Abdomen Hernia] : no hernia was discovered [Size (2+)] : size was 2+ [Nl Sphincter Tone] : normal sphincter tone [Rectal Exam - Prostate] : was indurated [Nl Perineum] : perineum was normal on inspection [No Lesions] : no lesions [Normal] : normal [Testes] : normal [Epididymis] : was normal [Vas Deferens / Spermatic Cord] : was normal [Heart Rate And Rhythm] : Heart rate and rhythm were normal [Edema] : no peripheral edema [Respiration, Rhythm And Depth] : normal respiratory rhythm and effort [] : no respiratory distress [Chest Palpation] : palpation of the chest revealed no abnormalities [Exaggerated Use Of Accessory Muscles For Inspiration] : no accessory muscle use [Auscultation Breath Sounds / Voice Sounds] : lungs were clear to auscultation bilaterally [Affect] : the affect was normal [Oriented To Time, Place, And Person] : oriented to person, place, and time [Lungs Percussion] : the lungs were normal to percussion [Not Anxious] : not anxious [Mood] : the mood was normal [Normal Station and Gait] : the gait and station were normal for the patient's age [No Focal Deficits] : no focal deficits [No Palpable Adenopathy] : no palpable adenopathy [Prostate Hard Area Or Nodule Bilaterally] : had no palpable nodules [Prostate Tenderness] : was not tender [Prostate Fluctuant] : was not fluctuant [Occult Blood Positive] : exam was negative for occult blood [Rectal Tenderness] : no tenderness [Mass___cm] : no rectal masses [Discharge] : no discharge [Circumcised] : the penis was uncircumcised [FreeTextEntry1] : Normal to auscultation

## 2020-12-21 PROBLEM — N39.0 URINARY TRACT INFECTION WITH HEMATURIA, SITE UNSPECIFIED: Status: RESOLVED | Noted: 2019-06-12 | Resolved: 2020-12-21

## 2021-10-06 PROBLEM — I10 ESSENTIAL HYPERTENSION: Status: ACTIVE | Noted: 2019-02-11
